# Patient Record
(demographics unavailable — no encounter records)

---

## 2024-12-13 NOTE — CARDIOLOGY SUMMARY
[de-identified] : 12/12/2024: NSR, normal axis, normal intervals, (-) ST-T wave changes. ======================================================= [de-identified] : TTE - 11/23/2022:  CONCLUSIONS: 1. Left ventricular ejection fraction is normal with an ejection fraction of 59 % by Hankins's biplane method of discs. 2. Normal right ventricular size and systolic function. 3. The left atrium is normal in size. 4. The right atrium is normal in size. 5. Trace mitral valve regurgitation. 6. Normal aortic valve, without any evidence of aortic stenosis or regurgitation. 7. Normal mitral valve structure and function. No mitral stenosis or significant regurgitation. 8. The pulmonary valve is normal in structure and function, with good leaflet excursion, and without any evidence of pulmonary stenosis or significant regurgitation. 9. Tricuspid valve is structurally normal with no stenosis or significant regurgitation. =======================================================

## 2024-12-13 NOTE — DISCUSSION/SUMMARY
[EKG obtained to assist in diagnosis and management of assessed problem(s)] : EKG obtained to assist in diagnosis and management of assessed problem(s) [FreeTextEntry1] : EKG performed today was unremarkable.  HLD: The impression is hyperlipidemia. Currently, LDL is elevated at 125. There are no changes in medication management. Continue current medical therapy. Other planned treatment includes diet modification, exercise, and weight loss.  Overweight: Discussed risks of being overweight with the patient. Counselled on weight loss with dietary modification and increased physical activity/exercise.  Provided reassurance. Instructed to follow up in 1 year.  Plan was discussed with the patient.    I, Dr. Arnold, personally performed the evaluation and management services for this patient. That E&M includes reviewing prior history/tests, conducting the medically appropriate examination and evaluation, assessing all conditions, establishing a plan of care, and counselling and educating the patient. I spent a total of 30 minutes on today's encounter evaluating and treating the patient.

## 2024-12-13 NOTE — HISTORY OF PRESENT ILLNESS
[FreeTextEntry1] : DIALLO RUTHERFORD is a 56-year-old female, with a PMHx significant for HLD and h/o COVID-19, who presents today for a follow up visit. Denies any new complaints. Reports she is feeling well. Patient noted to have an LDL of 125. Otherwise: (-) chest pain, (-) SOB.

## 2024-12-13 NOTE — CARDIOLOGY SUMMARY
[de-identified] : 12/12/2024: NSR, normal axis, normal intervals, (-) ST-T wave changes. ======================================================= [de-identified] : TTE - 11/23/2022:  CONCLUSIONS: 1. Left ventricular ejection fraction is normal with an ejection fraction of 59 % by Hankins's biplane method of discs. 2. Normal right ventricular size and systolic function. 3. The left atrium is normal in size. 4. The right atrium is normal in size. 5. Trace mitral valve regurgitation. 6. Normal aortic valve, without any evidence of aortic stenosis or regurgitation. 7. Normal mitral valve structure and function. No mitral stenosis or significant regurgitation. 8. The pulmonary valve is normal in structure and function, with good leaflet excursion, and without any evidence of pulmonary stenosis or significant regurgitation. 9. Tricuspid valve is structurally normal with no stenosis or significant regurgitation. =======================================================

## 2024-12-20 NOTE — IMAGING
[de-identified] : Physical exam of the right knee: Trace effusion, no erythema or ecchymosis.  Full extension, flexion to 90 degrees actively, 110 degrees passively.  She has pain with flexion.  She has positive patellar grind.  No tenderness to palpation over the joint lines.  Stable to varus and valgus stress testing.  Intact to light touch, mild antalgic gait.

## 2024-12-20 NOTE — DATA REVIEWED
[Right] : of the right [Knee] : knee [FreeTextEntry1] : 3 standing views of the right knee were obtained in the office today and show: No fracture or dislocation.  Well-preserved medial and lateral compartments.  On the lateral view, there is patellofemoral component space narrowing with an enthesophyte at the superior aspect of the patella.

## 2024-12-20 NOTE — DISCUSSION/SUMMARY
[de-identified] : I reviewed the x-ray findings with the patient.  I recommend formal physical therapy and a course of anti-inflammatory medication.  Rx for meloxicam sent to the pharmacy.  She will start formal physical therapy.  She will follow-up in 6 weeks for further evaluation with Dr. Gonsalves.  Of note the patient was given a note to remain out of work.

## 2024-12-20 NOTE — HISTORY OF PRESENT ILLNESS
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  She states over the past 4 to 5 days she developed pain in the right knee, no trauma to the area.  She reports difficulty going up and down stairs as well as ambulating on flat ground.  She points over the anterior aspect of the knee as to where the pain is.  She has not had any difficulty with his knee in the past.

## 2025-01-28 NOTE — HISTORY OF PRESENT ILLNESS
[de-identified] : Patient here for evaluation right knee pain. has been feeling much better, pain now 2-3/10  NAD Right knee No skin breakdown ant TTP Positive patella grind PF crepitus Negative lachman Negative varus/valgus instability ROM 0-110 Pain with forced extension and flexion NVI Compartments soft and NT  Xray reviewed and significant for mild right knee arthritis, jt space maintained  Plan went over findings expained the imaging since pt has been working, will cont cont pain control as needed if symptoms worsen will consider injections